# Patient Record
Sex: FEMALE | HISPANIC OR LATINO
[De-identification: names, ages, dates, MRNs, and addresses within clinical notes are randomized per-mention and may not be internally consistent; named-entity substitution may affect disease eponyms.]

---

## 2024-05-14 ENCOUNTER — DASHBOARD ENCOUNTERS (OUTPATIENT)
Age: 19
End: 2024-05-14

## 2024-05-14 ENCOUNTER — OFFICE VISIT (OUTPATIENT)
Dept: URBAN - METROPOLITAN AREA CLINIC 54 | Facility: CLINIC | Age: 19
End: 2024-05-14
Payer: COMMERCIAL

## 2024-05-14 VITALS
BODY MASS INDEX: 18.4 KG/M2 | WEIGHT: 100 LBS | HEIGHT: 62 IN | DIASTOLIC BLOOD PRESSURE: 63 MMHG | SYSTOLIC BLOOD PRESSURE: 101 MMHG | TEMPERATURE: 97.3 F | HEART RATE: 58 BPM

## 2024-05-14 DIAGNOSIS — R10.13 EPIGASTRIC PAIN: ICD-10-CM

## 2024-05-14 DIAGNOSIS — R12 HEARTBURN: ICD-10-CM

## 2024-05-14 PROCEDURE — 99243 OFF/OP CNSLTJ NEW/EST LOW 30: CPT | Performed by: PHYSICIAN ASSISTANT

## 2024-05-14 PROCEDURE — 99203 OFFICE O/P NEW LOW 30 MIN: CPT | Performed by: PHYSICIAN ASSISTANT

## 2024-05-14 RX ORDER — SUCRALFATE 1 G/1
1 TABLET ON AN EMPTY STOMACH TABLET ORAL TWICE A DAY
Qty: 60 | OUTPATIENT
Start: 2024-05-14 | End: 2024-06-13

## 2024-05-14 RX ORDER — PANTOPRAZOLE SODIUM 40 MG/1
1 TABLET TABLET, DELAYED RELEASE ORAL ONCE A DAY
Qty: 30 | OUTPATIENT
Start: 2024-05-14

## 2024-05-18 LAB — H PYLORI BREATH TEST: NEGATIVE

## 2024-06-25 ENCOUNTER — OFFICE VISIT (OUTPATIENT)
Dept: URBAN - METROPOLITAN AREA CLINIC 54 | Facility: CLINIC | Age: 19
End: 2024-06-25
Payer: COMMERCIAL

## 2024-06-25 VITALS
HEIGHT: 62 IN | BODY MASS INDEX: 18.11 KG/M2 | HEART RATE: 60 BPM | TEMPERATURE: 97.3 F | WEIGHT: 98.4 LBS | DIASTOLIC BLOOD PRESSURE: 81 MMHG | SYSTOLIC BLOOD PRESSURE: 116 MMHG

## 2024-06-25 DIAGNOSIS — R10.13 EPIGASTRIC PAIN: ICD-10-CM

## 2024-06-25 PROCEDURE — 99214 OFFICE O/P EST MOD 30 MIN: CPT | Performed by: PHYSICIAN ASSISTANT

## 2024-06-25 RX ORDER — SUCRALFATE 1 G/1
1 TABLET ON AN EMPTY STOMACH TABLET ORAL TWICE A DAY
Status: ACTIVE | COMMUNITY

## 2024-06-25 RX ORDER — PANTOPRAZOLE SODIUM 40 MG/1
1 TABLET TABLET, DELAYED RELEASE ORAL ONCE A DAY
Qty: 30 | Refills: 2 | OUTPATIENT

## 2024-06-25 RX ORDER — PANTOPRAZOLE SODIUM 40 MG/1
1 TABLET TABLET, DELAYED RELEASE ORAL ONCE A DAY
Qty: 30 | Status: ACTIVE | COMMUNITY
Start: 2024-05-14

## 2024-06-25 NOTE — HPI-TODAY'S VISIT:
Steven Recinos is a 19-year-old female with PMH of anxiety/depression who was referred by PCP for dyspepsia.  A copy of this document will be sent to the referring provider.  Patient reports burning, epigastric discomfort for the last several months.  She also complains of occasional heartburn.  Symptoms are intermittent.  She denies NSAIDs, tobacco, or EtOH.  She has not yet tried PPI.  No previous abdominal surgeries.  No previous EGD or colonoscopy.  6/25/24: Patient presents for routine follow-up.  Negative H. pylori.  She started PPI and Carafate and ran out of PPI one week ago. She is adhering to a gastritis diet. Symptoms have improved but still occurs QOD for approx one hour. She also reports frequent belching which she forgot to mention previously.

## 2024-08-27 ENCOUNTER — OFFICE VISIT (OUTPATIENT)
Dept: URBAN - METROPOLITAN AREA CLINIC 54 | Facility: CLINIC | Age: 19
End: 2024-08-27
Payer: COMMERCIAL

## 2024-08-27 VITALS
BODY MASS INDEX: 17.48 KG/M2 | WEIGHT: 95 LBS | TEMPERATURE: 98.2 F | HEIGHT: 62 IN | DIASTOLIC BLOOD PRESSURE: 70 MMHG | SYSTOLIC BLOOD PRESSURE: 88 MMHG

## 2024-08-27 DIAGNOSIS — R10.13 EPIGASTRIC PAIN: ICD-10-CM

## 2024-08-27 PROCEDURE — 99213 OFFICE O/P EST LOW 20 MIN: CPT | Performed by: PHYSICIAN ASSISTANT

## 2024-08-27 NOTE — HPI-TODAY'S VISIT:
Steven Recinos is a 19-year-old female with PMH of anxiety/depression who was referred by PCP for dyspepsia.  A copy of this document will be sent to the referring provider.  Patient reports burning, epigastric discomfort for the last several months.  She also complains of occasional heartburn.  Symptoms are intermittent.  She denies NSAIDs, tobacco, or EtOH.  She has not yet tried PPI.  No previous abdominal surgeries.  No previous EGD or colonoscopy.  6/25/24: Patient presents for routine follow-up.  Negative H. pylori.  She started PPI and Carafate and ran out of PPI one week ago. She is adhering to a gastritis diet. Symptoms have improved but still occurs QOD for approx one hour. She also reports frequent belching which she forgot to mention previously.  8/27/24:Pt presents for routine follow up. Pt remains on PPI. She has no epigastric pain if she avoids spicy and high fat foods. Reports improvement to eructations. She does continue to lose weight. She reports eating three times daily.

## 2024-08-27 NOTE — PHYSICAL EXAM NEUROLOGIC:
oriented to person, place and time , normal sensation , short and long term memory intact Scribe Attestation (For Scribes USE Only)... Attending Attestation (For Attendings USE Only).../Scribe Attestation (For Scribes USE Only)...